# Patient Record
Sex: MALE | Race: WHITE
[De-identification: names, ages, dates, MRNs, and addresses within clinical notes are randomized per-mention and may not be internally consistent; named-entity substitution may affect disease eponyms.]

---

## 2020-04-18 ENCOUNTER — HOSPITAL ENCOUNTER (INPATIENT)
Dept: HOSPITAL 92 - ERS | Age: 35
LOS: 5 days | Discharge: HOME | DRG: 391 | End: 2020-04-23
Attending: FAMILY MEDICINE | Admitting: FAMILY MEDICINE
Payer: COMMERCIAL

## 2020-04-18 VITALS — BODY MASS INDEX: 17.9 KG/M2

## 2020-04-18 DIAGNOSIS — N17.9: ICD-10-CM

## 2020-04-18 DIAGNOSIS — F10.230: ICD-10-CM

## 2020-04-18 DIAGNOSIS — E73.9: ICD-10-CM

## 2020-04-18 DIAGNOSIS — K22.6: ICD-10-CM

## 2020-04-18 DIAGNOSIS — K70.0: ICD-10-CM

## 2020-04-18 DIAGNOSIS — F17.200: ICD-10-CM

## 2020-04-18 DIAGNOSIS — K70.10: ICD-10-CM

## 2020-04-18 DIAGNOSIS — E86.9: ICD-10-CM

## 2020-04-18 DIAGNOSIS — K21.0: Primary | ICD-10-CM

## 2020-04-18 DIAGNOSIS — D62: ICD-10-CM

## 2020-04-18 DIAGNOSIS — Z71.6: ICD-10-CM

## 2020-04-18 DIAGNOSIS — R63.6: ICD-10-CM

## 2020-04-18 DIAGNOSIS — R91.1: ICD-10-CM

## 2020-04-18 DIAGNOSIS — F41.9: ICD-10-CM

## 2020-04-18 DIAGNOSIS — D72.829: ICD-10-CM

## 2020-04-18 LAB
ALBUMIN SERPL BCG-MCNC: 5 G/DL (ref 3.5–5)
ALBUMIN SERPL BCG-MCNC: 5 G/DL (ref 3.5–5)
ALP SERPL-CCNC: 118 U/L (ref 40–110)
ALP SERPL-CCNC: 121 U/L (ref 40–110)
ALT SERPL W P-5'-P-CCNC: 308 U/L (ref 8–55)
ALT SERPL W P-5'-P-CCNC: 309 U/L (ref 8–55)
ANION GAP SERPL CALC-SCNC: 18 MMOL/L (ref 10–20)
APAP SERPL-MCNC: (no result) MCG/ML (ref 10–30)
APTT PPP: 28.1 SEC (ref 22.9–36.1)
AST SERPL-CCNC: 570 U/L (ref 5–34)
AST SERPL-CCNC: 576 U/L (ref 5–34)
BASOPHILS # BLD AUTO: 0 THOU/UL (ref 0–0.2)
BASOPHILS NFR BLD AUTO: 0.1 % (ref 0–1)
BILIRUB DIRECT SERPL-MCNC: 1.1 MG/DL (ref 0.1–0.3)
BILIRUB SERPL-MCNC: 2 MG/DL (ref 0.2–1.2)
BILIRUB SERPL-MCNC: 2.2 MG/DL (ref 0.2–1.2)
BUN SERPL-MCNC: 30 MG/DL (ref 8.9–20.6)
CALCIUM SERPL-MCNC: 9.7 MG/DL (ref 7.8–10.44)
CHLORIDE SERPL-SCNC: 95 MMOL/L (ref 98–107)
CO2 SERPL-SCNC: 27 MMOL/L (ref 22–29)
CREAT CL PREDICTED SERPL C-G-VRATE: 0 ML/MIN (ref 70–130)
DRUG SCREEN CUTOFF: (no result)
EOSINOPHIL # BLD AUTO: 0 THOU/UL (ref 0–0.7)
EOSINOPHIL NFR BLD AUTO: 0 % (ref 0–10)
GLOBULIN SER CALC-MCNC: 2.4 G/DL (ref 2.4–3.5)
GLUCOSE SERPL-MCNC: 173 MG/DL (ref 70–105)
GLUCOSE UR STRIP-MCNC: 30 MG/DL
HBCM INDEX: 0.06 S/CO (ref 0–0.79)
HBSAG INDEX: 0.17 S/CO (ref 0–0.99)
HEP A IGM S/CO: 0.17 S/CO (ref 0–0.79)
HEP C INDEX: 0.04 S/CO (ref 0–0.79)
HGB BLD-MCNC: 13.5 G/DL (ref 14–18)
HIV 1/2 INDEX: 0.07 S/CO (ref ?–1)
INR PPP: 1.2
LIPASE SERPL-CCNC: 56 U/L (ref 8–78)
LYMPHOCYTES # BLD: 0.8 THOU/UL (ref 1.2–3.4)
LYMPHOCYTES NFR BLD AUTO: 5.5 % (ref 21–51)
MAGNESIUM SERPL-MCNC: 1.7 MG/DL (ref 1.6–2.6)
MCH RBC QN AUTO: 34.9 PG (ref 27–31)
MCV RBC AUTO: 103 FL (ref 78–98)
MEDTOX CONTROL LINE VALID?: (no result)
MEDTOX READER #: (no result)
MONOCYTES # BLD AUTO: 0.9 THOU/UL (ref 0.11–0.59)
MONOCYTES NFR BLD AUTO: 6.5 % (ref 0–10)
MUCOUS THREADS UR QL AUTO: (no result) LPF
NEUTROPHILS # BLD AUTO: 11.8 THOU/UL (ref 1.4–6.5)
NEUTROPHILS NFR BLD AUTO: 87.8 % (ref 42–75)
PLATELET # BLD AUTO: 203 THOU/UL (ref 130–400)
POTASSIUM SERPL-SCNC: 4.5 MMOL/L (ref 3.5–5.1)
PROT UR STRIP.AUTO-MCNC: 70 MG/DL
PROTHROMBIN TIME: 15.4 SEC (ref 12–14.7)
RBC # BLD AUTO: 3.88 MILL/UL (ref 4.7–6.1)
RBC UR QL AUTO: (no result) HPF (ref 0–3)
SALICYLATES SERPL-MCNC: (no result) MG/DL (ref 15–30)
SODIUM SERPL-SCNC: 135 MMOL/L (ref 136–145)
SYPHILIS ANTIBODY INDEX: 0.02 S/CO
TSH SERPL DL<=0.005 MIU/L-ACNC: 0.39 UIU/ML (ref 0.35–4.94)
WBC # BLD AUTO: 13.4 THOU/UL (ref 4.8–10.8)
WBC UR QL AUTO: (no result) HPF (ref 0–3)

## 2020-04-18 PROCEDURE — 80053 COMPREHEN METABOLIC PANEL: CPT

## 2020-04-18 PROCEDURE — 85025 COMPLETE CBC W/AUTO DIFF WBC: CPT

## 2020-04-18 PROCEDURE — 80306 DRUG TEST PRSMV INSTRMNT: CPT

## 2020-04-18 PROCEDURE — 36415 COLL VENOUS BLD VENIPUNCTURE: CPT

## 2020-04-18 PROCEDURE — 83735 ASSAY OF MAGNESIUM: CPT

## 2020-04-18 PROCEDURE — P9016 RBC LEUKOCYTES REDUCED: HCPCS

## 2020-04-18 PROCEDURE — 81003 URINALYSIS AUTO W/O SCOPE: CPT

## 2020-04-18 PROCEDURE — 86850 RBC ANTIBODY SCREEN: CPT

## 2020-04-18 PROCEDURE — C9113 INJ PANTOPRAZOLE SODIUM, VIA: HCPCS

## 2020-04-18 PROCEDURE — 86901 BLOOD TYPING SEROLOGIC RH(D): CPT

## 2020-04-18 PROCEDURE — 87389 HIV-1 AG W/HIV-1&-2 AB AG IA: CPT

## 2020-04-18 PROCEDURE — 96361 HYDRATE IV INFUSION ADD-ON: CPT

## 2020-04-18 PROCEDURE — 84443 ASSAY THYROID STIM HORMONE: CPT

## 2020-04-18 PROCEDURE — 80307 DRUG TEST PRSMV CHEM ANLYZR: CPT

## 2020-04-18 PROCEDURE — 84100 ASSAY OF PHOSPHORUS: CPT

## 2020-04-18 PROCEDURE — 80074 ACUTE HEPATITIS PANEL: CPT

## 2020-04-18 PROCEDURE — 74177 CT ABD & PELVIS W/CONTRAST: CPT

## 2020-04-18 PROCEDURE — 36430 TRANSFUSION BLD/BLD COMPNT: CPT

## 2020-04-18 PROCEDURE — 76705 ECHO EXAM OF ABDOMEN: CPT

## 2020-04-18 PROCEDURE — 96365 THER/PROPH/DIAG IV INF INIT: CPT

## 2020-04-18 PROCEDURE — 83690 ASSAY OF LIPASE: CPT

## 2020-04-18 PROCEDURE — 96375 TX/PRO/DX INJ NEW DRUG ADDON: CPT

## 2020-04-18 PROCEDURE — 85730 THROMBOPLASTIN TIME PARTIAL: CPT

## 2020-04-18 PROCEDURE — 86900 BLOOD TYPING SEROLOGIC ABO: CPT

## 2020-04-18 PROCEDURE — 81015 MICROSCOPIC EXAM OF URINE: CPT

## 2020-04-18 PROCEDURE — 86780 TREPONEMA PALLIDUM: CPT

## 2020-04-18 PROCEDURE — 83036 HEMOGLOBIN GLYCOSYLATED A1C: CPT

## 2020-04-18 PROCEDURE — 85610 PROTHROMBIN TIME: CPT

## 2020-04-18 RX ADMIN — ONDANSETRON SCH MG: 2 INJECTION INTRAMUSCULAR; INTRAVENOUS at 21:49

## 2020-04-18 RX ADMIN — ONDANSETRON SCH MG: 2 INJECTION INTRAMUSCULAR; INTRAVENOUS at 15:03

## 2020-04-18 NOTE — PDOC.FPRHP
- History of Present Illness


Chief Complaint: nausea/vomting


History of Present Illness: 


36yo M with h/o EtOH abuse presents for 3day history of nausea and vomiting. 

States onset of sxs Thursday night with fatigue, awoke that night with body 

aches, fever, chills, diaphoresis, and nausea. All day yesterday had nausea 

with vomiting, initially clear liquid vomit, then pink-tinged, and now with 

dark red blood mixed in, nonbilious. Unable to keep solids or liquids down. 

Mild epigastric pain with vomiting. Decreased urine output. 1 normal BM 

yesterday, no diarrhea. No known sick contacts, family members at home without 

similar sxs, no previous similar episodes. Drinks 3+ 8oz glasses of wine 

nightly for many years. Smokes about 0.5ppd for 5-6 years. No history of IV 

drug abuse or STDs. States he has never had alcohol withdrawal seizure but does 

get shakes when he does drink. Called telehealth GI yesterday who sent in 

nausea med with mild improvement of sxs.





ED Course: 


Given Reglan 10mg, Zofran 8mg, 2L NS








- Allergies/Adverse Reactions


 Allergies











Allergy/AdvReac Type Severity Reaction Status Date / Time


 


No Known Allergies Allergy   Unverified 04/18/20 14:05














- Home Medications


 











 Medication  Instructions  Recorded  Confirmed  Type


 


No Known  04/18/20 04/18/20 History














- History


PMHx: Lactose intolerance


 


PSHx: L knee





FHx: Grandfather with MI, CAD. Aunt with lymphoma. 


 


Social: Drinks 3+ 8oz glasses of wine daily for many years. THC use years ago, 

no recent. No IV drug abuse. Smokes <0.5ppd for 5-6 years. Lives at home with 

wife and 2 children. Thinks wife had syphillis in past, he has not been tested. 

Works in oil field.


 








- Review of Systems


General: reports: fever/chills (chills), weight/appetite/sleep changes (

decreased), night sweats, fatigue


Eyes: denies: vision changes


ENT: denies: nasal congestion, rhinorrhea


Respiratory: denies: cough, congestion, shortness of breath


Cardiovascular: denies: chest pain, palpitation, edema


Gastrointestinal: reports: nausea, vomiting, abdominal pain (mild epigastric).  

denies: diarrhea, constipation


Genitourinary: denies: incontinence, dysuria


Skin: denies: rashes


Neurological: denies: numbness





- Vital signs


BP: 129/89  HR: 124 -> 105 RR: 16 Tmax: 98.6 Pox: 99% on RA  Wt: 63kg   








- Physical Exam


Constitutional: NAD, awake, alert and oriented, well developed, other (Thin, 

well-appearing, well-groomed, thin)


HEENT: PERRLA, EOMI, no scleral icterus, grossly normal vision, MMM (after IVF 

in ED), oropharynx clear


Neck: supple


Heart: RRR, normal S1/S2, no murmurs/rubs/gallops, pulses present, no edema


Lungs: CTAB, no respiratory distress, good air movement, no rales/rhonchi, no 

wheezing


Abdomen: soft, bowel sounds present, other (very mild epigastric TTP)


Musculoskeletal: normal structure, normal tone


Neurological: no focal deficit, other (no tremor)


Skin: no rash/lesions


Heme/Lymphatic: no unusual bruising or bleeding


Psychiatric: normal mood and affect





FMR H&P: Results





- Labs


Result Diagrams: 


 04/18/20 08:56





 04/18/20 08:56


Lab results: 


 











WBC  13.4 thou/uL (4.8-10.8)  H  04/18/20  08:56    


 


Hgb  13.5 g/dL (14.0-18.0)  L  04/18/20  08:56    


 


Hct  39.9 % (42.0-52.0)  L  04/18/20  08:56    


 


MCV  103.0 fL (78.0-98.0)  H  04/18/20  08:56    


 


Plt Count  203 thou/uL (130-400)   04/18/20  08:56    


 


Neutrophils %  87.8 % (42.0-75.0)  H  04/18/20  08:56    


 


Sodium  135 mmol/L (136-145)  L  04/18/20  08:56    


 


Potassium  4.5 mmol/L (3.5-5.1)   04/18/20  08:56    


 


Chloride  95 mmol/L ()  L  04/18/20  08:56    


 


Carbon Dioxide  27 mmol/L (22-29)   04/18/20  08:56    


 


BUN  30 mg/dL (8.9-20.6)  H  04/18/20  08:56    


 


Creatinine  1.55 mg/dL (0.7-1.3)  H  04/18/20  08:56    


 


Glucose  173 mg/dL ()  H  04/18/20  08:56    


 


Calcium  9.7 mg/dL (7.8-10.44)   04/18/20  08:56    


 


Total Bilirubin  2.0 mg/dL (0.2-1.2)  H  04/18/20  08:56    


 


AST  570 U/L (5-34)  H  04/18/20  08:56    


 


ALT  308 U/L (8-55)  H  04/18/20  08:56    


 


Alkaline Phosphatase  121 U/L ()  H  04/18/20  08:56    


 


Serum Total Protein  7.4 g/dL (6.0-8.3)   04/18/20  08:56    


 


Albumin  5.0 g/dL (3.5-5.0)   04/18/20  08:56    


 


Lipase  56 U/L (8-78)   04/18/20  08:56    


 


Urine Ketones  20 mg/dL (Negative)  A  04/18/20  Unknown


 


Urine Blood  Negative  (Negative)   04/18/20  Unknown


 


Urine Nitrite  Negative  (Negative)   04/18/20  Unknown


 


Ur Leukocyte Esterase  Negative [place]olia/uL (Negative)   04/18/20  Unknown


 


Urine RBC  0-3 HPF (0-3)   04/18/20  Unknown


 


Urine WBC  4-6 HPF (0-3)  A  04/18/20  Unknown


 


Ur Squamous Epith Cells  0-3 HPF (0-3)   04/18/20  Unknown


 


Urine Bacteria  None Seen HPF (None Seen)   04/18/20  Unknown














- Radiology Interpretation


  ** CT scan - abdomen


Status: report reviewed by me (0.5cm RML pulm nodule, fatty liver, tiny R renal 

cyst)





FMR H&P: A/P





- Problem List


(1) Alcoholic hepatitis


Current Visit: Yes   Status: Acute   Code(s): K70.10 - ALCOHOLIC HEPATITIS 

WITHOUT ASCITES   





(2) Hematemesis


Current Visit: Yes   Status: Acute   Code(s): K92.0 - HEMATEMESIS   





(3) ROSINA (acute kidney injury)


Current Visit: Yes   Status: Acute   Code(s): N17.9 - ACUTE KIDNEY FAILURE, 

UNSPECIFIED   





- Plan


36yo M with h/o EtOH abuse presents for hematemasis and nausea





#Suspected Acute Alcoholic hepatitis


- 3 day onset of nausea, vomiting, mild epigastric pain


- Transaminitis with , , Tbili 2.2


- Hepatitis panel neg


- Will check HIV, RPR


- CT abd - fatty liver


- Ordered RUQ US


- Coags pending


- Alcohol lv pending - drinks 3+ 8oz glasses of wine nightly


- UDS, APAP lv pending





#Hematemesis, suspect Janis Wallace tear


- likely 2/2 above. approx 250cc at bedside


- Place NPO, IV Protonix BID


- Will consult GI, Dr. Klein, apprec recs


- Zofran natalie


- Hb 13.5, likely lower as he is hemoconcentrated, will monitor





#EtOH abuse with withdrawals


- Last drink 4/16, drinks 3+ glasses of wine nightly


- ASE protocol


- Librium taper


- Folic acid, MV, Thiamine





#Suspected prerenal ROSINA


- Cr 1.55, s/p 2L NS in ED


- LR @ 100cc/hr


- monitor





#Leukocytosis


- WBC 13.4, likely 2/2 volume depletion


- no s/s of infection, will monitor





#Pulmonary nodule


- incidental findings on CT Abd


- needs outpt follow up





#Tob abuse


- counseled on cessation





#Lactose Intolerance


- avoid triggering foods





PCP: None - Dr. Sual many years ago


Code: Full





IVF: LR @ 100cc/hr


Diet: NPO


VTE: none - suspected GI bleed





Disposition/LOS: 


Admit to medical inpt for acute alcoholic hepatitis and hematemasis. Workup 

pending. GI consulted. Monitor for EtOH withdrawals. Anticipate LOS > 48hrs.








FMR H&P: Upper Level





- Pertinent history





36 yo M here with complaint of vomiting blood for the past 24 hours. He states 

that on 4/16 he started to have general malaise and loss of appetite. That 

night he had multiple episodes of vomiting, during which time he noticed blood 

streaking and progressive darkening of vomitus. On 4/17-18 he noted vomit was 

all blood/coffee grounds. He has a hx of heavy etoh use daily. His last drink 

was on 4/16. In the past he gets tremors when he goes too long without a drink. 

He denies any hx of IVDU. No hx of hepatitis. CT in the ER demonstrated a 

markedly enlarged liver with fatty infiltrate. 





No known PMHx





Surgical Hx  L tibia ORIF





Social Hx


4+ glasses of wine daily, hard liquor daily


0.5 ppd smoker


Denies drug use





Fam Hx  non contributory 








- Pertinent findings





See intern note for full ROS, PE, vitals, and labs





ROS


General Complains of chills. Denies fever


CV Denies CP, palpitation, or peripheral edema


Resp Denies SOB or cough 


GI complains of vomiting blood. Denies diarrhea or bloody stool. Complains of 

mild abd pain


 denies increased frequency or dysuria 


Neuro denies numbness or weakness





PE


General A&O x4, NAD


HEENT NCAT


CV RRR, no murmur 


Resp CTA, no respiratory distress


Abd RUQ and LUQ with mild TTP. Non palpable liver. No distension or guarding


Extremities no edema, equal pedal pulses


Neuro no focal deficits, CN II-XII intact








- Plan


Date/Time: 04/18/20 6385








I, Eliel Bueno, , have evaluated this patient and agree with findings/plan 

as outlined by intern resident. Pertinent changes/additions are listed here.








1.Acute alcoholic hepatitis 


-Start thiamine, B12, and folate


-Zofran prn for n/v


-RUQ US


-Viral hep panel negative. HIV pending 


-Maintenance IVF


-Check coags and Mg/Phos.  Monitor CMP and coags daily


-ASE protocol


-Consult GI





2.Hematemesis  


-Likely secondary to Janis Wallace tear. 


-Start IV protonix daily. Zofran as above


-Hold NPO


-CBC in am





3.ROSINA


- Likely pre renal. 


-IVF, monitor daily labs 





4.Etoh abuse


-Case management consult 


-Advise cessation 





5.Hyperbilirubinemia 





6.Elevated LFT





7.Leukocytosis


-No concern for infection at this time, likely reactive 





8.Alcoholic fatty liver    





PPx SCD


Diet NPO


Code Full








Addendum - Attending





- Attending Attestation


Date/Time: 04/18/20 6578





I personally evaluated the patient and discussed the management with Dr. Dozier/

Ximena.


I agree with the History, Examination, Assessment and Plan documented above 

with any addition or exceptions noted below.

## 2020-04-18 NOTE — CT
ABDOMEN AND PELVIC CT SCAN WITH IV CONTRAST:

 

History: 

Nausea, vomiting. 

 

FINDINGS: 

0.5 cm diameter nodule in the right middle lobe. Severe fatty changes of the liver. No ductal dilatat
ion. Visualized gallbladder, pancreas, spleen, and adrenal glands are unremarkable. Too small to caroline
acterize low attenuation focus in the posterior right mid kidney statistically a small cyst. No renal
 calculus or acute  obstruction. No CT evidence for acute appendicitis. No evidence for large or sm
all bowel obstruction. No free intraperitoneal fluid within the abdomen or pelvis. Urinary bladder is
 unremarkable. 

 

IMPRESSION: 

1. 0.5 cm diameter pulmonary nodule in the right middle lobe. 

2. Consider one year follow up complete chest CT depending upon risks. 

3. Marked fatty changes in the liver. 

4. Too small to characterize right renal low attenuation focus, statistically a tiny cyst. 

5. No evidence for other significant acute process in the abdomen or pelvis. 

 

Code LN

## 2020-04-19 LAB
ALBUMIN SERPL BCG-MCNC: 3.3 G/DL (ref 3.5–5)
ALP SERPL-CCNC: 62 U/L (ref 40–110)
ALT SERPL W P-5'-P-CCNC: 120 U/L (ref 8–55)
ANION GAP SERPL CALC-SCNC: 12 MMOL/L (ref 10–20)
APTT PPP: 164 SEC (ref 22.9–36.1)
APTT PPP: 27.1 SEC (ref 22.9–36.1)
AST SERPL-CCNC: 172 U/L (ref 5–34)
BASOPHILS # BLD AUTO: 0 THOU/UL (ref 0–0.2)
BASOPHILS # BLD AUTO: 0 THOU/UL (ref 0–0.2)
BASOPHILS NFR BLD AUTO: 0.3 % (ref 0–1)
BASOPHILS NFR BLD AUTO: 0.5 % (ref 0–1)
BILIRUB SERPL-MCNC: 1.3 MG/DL (ref 0.2–1.2)
BUN SERPL-MCNC: 33 MG/DL (ref 8.9–20.6)
CALCIUM SERPL-MCNC: 8 MG/DL (ref 7.8–10.44)
CHLORIDE SERPL-SCNC: 104 MMOL/L (ref 98–107)
CO2 SERPL-SCNC: 26 MMOL/L (ref 22–29)
CREAT CL PREDICTED SERPL C-G-VRATE: 90 ML/MIN (ref 70–130)
EOSINOPHIL # BLD AUTO: 0 THOU/UL (ref 0–0.7)
EOSINOPHIL # BLD AUTO: 0 THOU/UL (ref 0–0.7)
EOSINOPHIL NFR BLD AUTO: 0.3 % (ref 0–10)
EOSINOPHIL NFR BLD AUTO: 0.4 % (ref 0–10)
GLOBULIN SER CALC-MCNC: 1.7 G/DL (ref 2.4–3.5)
GLUCOSE SERPL-MCNC: 93 MG/DL (ref 70–105)
HGB BLD-MCNC: 7.6 G/DL (ref 14–18)
HGB BLD-MCNC: 7.9 G/DL (ref 14–18)
HGB BLD-MCNC: 7.9 G/DL (ref 14–18)
INR PPP: (no result)
INR PPP: 1.2
LYMPHOCYTES # BLD: 1.1 THOU/UL (ref 1.2–3.4)
LYMPHOCYTES # BLD: 1.1 THOU/UL (ref 1.2–3.4)
LYMPHOCYTES NFR BLD AUTO: 12.8 % (ref 21–51)
LYMPHOCYTES NFR BLD AUTO: 13.2 % (ref 21–51)
MAGNESIUM SERPL-MCNC: 1.7 MG/DL (ref 1.6–2.6)
MCH RBC QN AUTO: 35.6 PG (ref 27–31)
MCH RBC QN AUTO: 35.9 PG (ref 27–31)
MCV RBC AUTO: 103 FL (ref 78–98)
MCV RBC AUTO: 103 FL (ref 78–98)
MONOCYTES # BLD AUTO: 0.9 THOU/UL (ref 0.11–0.59)
MONOCYTES # BLD AUTO: 0.9 THOU/UL (ref 0.11–0.59)
MONOCYTES NFR BLD AUTO: 10.2 % (ref 0–10)
MONOCYTES NFR BLD AUTO: 11.3 % (ref 0–10)
NEUTROPHILS # BLD AUTO: 6 THOU/UL (ref 1.4–6.5)
NEUTROPHILS # BLD AUTO: 6.4 THOU/UL (ref 1.4–6.5)
NEUTROPHILS NFR BLD AUTO: 74.8 % (ref 42–75)
NEUTROPHILS NFR BLD AUTO: 76.4 % (ref 42–75)
PLATELET # BLD AUTO: 122 THOU/UL (ref 130–400)
PLATELET # BLD AUTO: 123 THOU/UL (ref 130–400)
PLATELET # BLD AUTO: 129 THOU/UL (ref 130–400)
POTASSIUM SERPL-SCNC: 4.1 MMOL/L (ref 3.5–5.1)
PROTHROMBIN TIME: (no result) SEC (ref 12–14.7)
PROTHROMBIN TIME: 15.5 SEC (ref 12–14.7)
RBC # BLD AUTO: 2.21 MILL/UL (ref 4.7–6.1)
RBC # BLD AUTO: 2.22 MILL/UL (ref 4.7–6.1)
SODIUM SERPL-SCNC: 138 MMOL/L (ref 136–145)
WBC # BLD AUTO: 8 THOU/UL (ref 4.8–10.8)
WBC # BLD AUTO: 8.4 THOU/UL (ref 4.8–10.8)

## 2020-04-19 PROCEDURE — 0W3P8ZZ CONTROL BLEEDING IN GASTROINTESTINAL TRACT, VIA NATURAL OR ARTIFICIAL OPENING ENDOSCOPIC: ICD-10-PCS | Performed by: INTERNAL MEDICINE

## 2020-04-19 RX ADMIN — THERA TABS SCH TAB: TAB at 08:30

## 2020-04-19 RX ADMIN — ONDANSETRON SCH MG: 2 INJECTION INTRAMUSCULAR; INTRAVENOUS at 14:52

## 2020-04-19 RX ADMIN — ONDANSETRON SCH MG: 2 INJECTION INTRAMUSCULAR; INTRAVENOUS at 05:27

## 2020-04-19 RX ADMIN — ONDANSETRON SCH MG: 2 INJECTION INTRAMUSCULAR; INTRAVENOUS at 21:17

## 2020-04-19 NOTE — OP
DATE OF PROCEDURE:  04/19/2020



PROCEDURE PERFORMED:  EGD with control of hemorrhage.



INDICATION FOR PROCEDURE:  Hematemesis, nausea, and vomiting.



DESCRIPTION OF PROCEDURE:  After the risks and benefits of the procedure were

explained to the patient including risks of bleeding, infection, perforation,

reactions to anesthesia, aspiration and/or pain, informed consent was obtained.  The

patient was then taken to the endoscopy suite, where deep sedation was administered

via propofol and anesthesia support.  Once adequate sedation was achieved, the

patient was placed in the left lateral decubitus position, followed by introduction

of the therapeutic gastroscope into the mouth with intubation of the esophagus,

stomach, and the proximal small intestines with the findings listed below.  The

patient tolerated the procedure well with no immediate perioperative complications.

On conclusion of the procedure, all equipment was removed from the patient and he

was transferred to PACU in satisfactory condition. 



FINDINGS:  

1. Esophagus:  Normal-appearing mucosa was seen in the proximal and mid esophagus;

however, in the distal esophagus, a purplish hue, a patch of purple-appearing mucosa

was seen along the inferior aspect of the esophagus extending from 35 cm to 40 cm,

but no evidence of esophageal varices.  At the gastroesophageal junction, there were

3 linear ulcerations as well as multiple erosions, seen consistent with LA grade C

reflux esophagitis.  However, there were two spots at the gastroesophageal junction

that exhibited mild tearing of the mucosa as well as a visible vessel that was

actively bleeding on one and a probable visible vessel on the other that was

nonbleeding.  Both of these visible vessel sites were then intervened upon with

bipolar cauterization with good hemostasis and no active bleeding seen at the end of

the maneuver.  Otherwise, there was no evidence of mass lesions within the distal

esophagus. 

2. Stomach:  A large amount of retained blood and blood clot was seen within all

aspects of the stomach.  With aggressive irrigation and suctioning, adequate

visualization of the gastric mucosa was able to be achieved, but inadequate for the

evaluation of small mucosal lesions (less than 5 mm in size) of the mucosa seen.

Normal-appearing mucosa was seen in the gastric fundus, body, greater curvature,

antrum, and incisura.  On retroflexion, active oozing of blood was seen in the

gastric cardia, but was also seen emanating from the gastroesophageal junction

(prior to bipolar cauterization).  There was no evidence of erosions, ulcerations,

or mass lesions seen throughout the entire stomach. 

3. Duodenum:  Normal-appearing mucosa was seen in both the duodenal bulb and second

portion of the duodenum.  There was no evidence of erosions, ulcerations, mass

lesions, or active/recent bleeding. 



IMPRESSION:  

1. Two Janis-Wallace tears were seen at the gastroesophageal junction, both with

visible vessels seen.  However, one was actively bleeding at the time of this

endoscopy; good hemostasis was achieved with bipolar cauterization with no bleeding

at the end of maneuvers. 

2. LA grade C erosive esophagitis (most likely contributing to the Janis-Wallace

tear). 

3. Significant amount of blood in the stomach, limiting visualization of fine

mucosal lesions within the entire stomach itself, but no evidence of active bleeding

visualized in this region. 



RECOMMENDATIONS:  

1. We will continue to trend his H and H and transfuse as necessary to maintain an H

and H of 7/21. 

2. Continue to monitor clinically for signs of active GI bleeding.

3. We would continue the patient on PPI 40 mg IV b.i.d.

4. We would place the patient on a clear liquid diet and advance the diet slowly as

tolerated. 

5. Would continue with aggressive antiemetic control that seems to be in part

related to his alcohol withdrawal. 

6. Continue with alcohol withdrawal protocol. 



We will continue to follow.  Please call with any questions.







Job ID:  [id number]

## 2020-04-19 NOTE — CON
DATE OF CONSULTATION:  04/19/2020



REASON FOR CONSULTATION:  Hematemesis, melena, and elevated LFTs/alcoholic 
hepatitis.



CONSULTING PROVIDER:  Reg Dozier MD



HISTORY OF PRESENT ILLNESS:  The patient is a 35-year-old male with past medical

history of lactose intolerance and alcohol abuse, presenting with complaints of

nausea, vomiting, and hematemesis.  The patient states that he was in his usual

state of health until approximately 3 days ago when he began having increased

midepigastric abdominal discomfort.  The next morning, this was associated with

increased nausea and vomiting, initially with clear nonbilious emesis.  However
, as

he had repeated episodes of nausea and vomiting throughout the day, it became 
more

blood-tinged until finally it was more dark maroon in coloration.  This was

associated with increased weakness, midepigastric abdominal pain, subjective

chills/diaphoresis.  It initially prompted him to [facility]

where he spoke with Dr. Cassidy about his particular symptoms with the plan of 
care to

proceed with antiemetic control, but if his symptoms persisted, then he 
recommended

proceeding to the nearest ER for further evaluation.  With repeated episodes of

nausea, vomiting, as well as hematemesis, the patient then went to the [facility]

ER and was noted to have a decreased H and H, but only mildly decreased when

compared to baseline.  He was admitted to the hospital for observation 
overnight and

had no further episodes of nausea and vomiting with aggressive antiemetic 
control.

However, over the last 12 to 24 hours, the patient has been having increased

diarrhea consisting of dark black, semi-solid to liquid bowel movements, and 
also

had a concurrent drop in his H and H concerning for upper GI bleeding.  
Otherwise,

he denies any fevers, dysphagia, odynophagia, overt hematochezia, or recent 
weight

loss. 



REVIEW OF SYSTEMS:  A 10-category review of systems was obtained with all 
responses

negative except for the pertinent positives as listed in HPI. 



PAST MEDICAL HISTORY:  As per HPI.



PAST SURGICAL HISTORY:  Left knee arthroscopy.



FAMILY HISTORY:  Denies any GI malignancies.



SOCIAL HISTORY:  Drinks 3 or more glasses of wine nightly for at least the last 
3-5

years, smokes approximately 1/2 pack per day for the last 5 to 6 years.  He does

endorse remote marijuana use, but his last use was approximately 6 years ago. 



OUTPATIENT MEDICATIONS:  None.



ALLERGIES:  NO KNOWN DRUG ALLERGIES.



PHYSICAL EXAMINATION:

VITAL SIGNS:  Temperature 98.4, pulse 101, blood pressure 112/70, respiratory 
rate

18, saturating 93% on room air. 

GENERAL:  The patient was lying in bed, in no acute distress.  Alert and 
oriented

x4. 

HEENT:  Normocephalic and atraumatic. 

NECK:  Supple.  No JVD or scleral icterus noted. 

CARDIOVASCULAR:  Tachycardic rate but regular rhythm.  No discernible murmurs,

gallops, or rubs. 

RESPIRATORY:  Clear to auscultation bilaterally with no discernible wheezes or

rales. 

ABDOMEN:  Hypoactive bowel sounds.  Soft, nondistended.  Tenderness to 
palpation in

the midepigastric region only. 

EXTREMITIES:  No cyanosis, clubbing, or edema.



LABORATORY DATA:  CBC with a white blood cell count of 8.4, hemoglobin 7.9,

hematocrit 22.8, platelets 129.  INR 1.2.  Chemistry with a sodium of 138, 
potassium

4.1, chloride 104, CO2 of 26, BUN 33, creatinine 0.98, glucose 88.  , ALT

120, alkaline phosphatase 62, total bilirubin 1.3, albumin 3.3, lipase 56. 



IMAGING DATA:  CT of the abdomen and pelvis was obtained on April 18, 2020, 
which

showed a 5 mm pulmonary nodule within the right middle lobe with indeterminate

significance and too small to characterize, severe fatty changes were also seen

within the liver, but did not show any hepatic masses or ductal dilatation

associated with that finding.  There was no evidence of acute appendicitis,

small-bowel obstruction, or free intraperitoneal fluid.  Right upper quadrant

ultrasound was also obtained on April 19, 2020, with official read still 
pending at

this time. 



ASSESSMENT AND PLAN:  The patient is a 35-year-old male with past medical 
history of

alcohol abuse and lactose intolerance, presenting with hematemesis. 



Hematemesis:  The patient is presenting with a 3-day history of increased 
nausea and

vomiting that was initially characterized as clear nonbilious emesis.  However, 
with

repeated episodes of vomiting/retching, he has gradually had more blood-tinged 
and

then finally more maroon-colored hematemesis in addition to what appears to be

melenic-type stools.  He denies the use of NSAIDs that would increase his risk 
for

peptic ulcer disease, but it is also on the differential and CT scan is not 
showing

any significant abnormalities that would contribute to the above symptoms.  
However,

he has had a significant decrease in his H and H when compared to admission

concerning for active GI bleeding.  Differential could include esophagitis,

gastritis, duodenitis, peptic ulcer disease, arteriovenous malformation, 
Dieulafoy

lesion, and/or GI neoplasm (less likely). 



RECOMMENDATIONS:  

1. Would continue to trend his H and H and transfuse as necessary to maintain 
an H

and H of 7/21. 

2. Continue to monitor clinically for signs of active GI bleeding.

3. Continue the patient on PPI b.i.d. in addition to aggressive antiemetic 
control.

4. Continue n.p.o. status in preparation for EGD later this morning.  Further

recommendations to follow upper endoscopy. 



Elevated LFTs/alcoholic hepatitis

The patient is presenting with a significant alcohol abuse history, drinking 
approximately 3 or more glasses of wine daily for several years.  On admission, 
the patient was noted to have a moderate elevation in his AST, ALT, and Tbili 
with an ALT/AST predominance, consistent with a hepatocellular process.  However
, with more supportive care with IV fluid hydration, he has had a significant 
reduction in all of his LFTs (with normalization of Tbili and INR).  At this 
time, given the 2:1 predominance of AST to ALT on admission, this seems to be 
most consistent with mild alcoholic hepatitis.



RECOMMENDATIONS:

1. Would continue to trend his LFTs and INR daily to monitor liver function.

2. Continue with supportive care with IV fluid and thiamine administration as 
you are doing.

3. Strongly encourage alcohol cessation.

4. Steroid administration is not indicated at this time.

5. Agree with placing the patient on an alcoholic withdrawal protocol given his 
history of tremors with cessation in the past.



We will continue to follow.  Please call with any questions.







Job ID:  [id number]



[facility]

## 2020-04-19 NOTE — PDOC.FM
- Subjective


Subjective: 


Doing well this morning, no acute events overnight. No agitation, hallucinations

, restlessness. No further vomiting, nausea controlled, but is now having 4+ 

episodes of dark, tarry diarrhea. No fever/chills, SOB, CP, or abd pain. Eager 

to talk to GI today. He also states he turned to alcohol to treat his anxiety. 

He tried zoloft in the past but had SE's so stopped. He wants to quit alcohol 

and try a different medication for anxiety. Discussed possible AA meetings and 

other therapies as well. Very motivated to establish with PCP and begin 

treatments.








- Objective


MAR Reviewed: Yes


Vital Signs & Weight: 


 Vital Signs (12 hours)











  Temp Pulse Resp BP BP Pulse Ox


 


 04/19/20 04:15  98.5 F  109 H  18   106/66  95


 


 04/19/20 04:00     106/66  


 


 04/18/20 23:46  98.7 F  112 H  18   114/75  96


 


 04/18/20 20:11  98.1 F  107 H  18   123/87  96


 


 04/18/20 20:10     123/87  








 Weight











Weight                         60.192 kg














I&O: 


 











 04/18/20 04/19/20 04/20/20





 06:59 06:59 06:59


 


Intake Total  1000 


 


Balance  1000 











Result Diagrams: 


 04/19/20 08:08





 04/19/20 06:58





Phys Exam





- Physical Examination


Constitutional: NAD (resting comfortably, no tremor, good spirits)


HEENT: moist MMs


Neck: supple


Respiratory: no wheezing, no rales, no rhonchi, clear to auscultation bilateral


Cardiovascular: RRR, no significant murmur, no rub


Gastrointestinal: soft, non-tender, no distention, positive bowel sounds


Musculoskeletal: no edema


Neurological: moves all 4 limbs


Psychiatric: normal affect, A&O x 3


Deviation from normal: anxiety





Dx/Plan


(1) Alcoholic hepatitis


Code(s): K70.10 - ALCOHOLIC HEPATITIS WITHOUT ASCITES   Status: Acute   





(2) Hematemesis


Code(s): K92.0 - HEMATEMESIS   Status: Acute   





(3) ROSINA (acute kidney injury)


Code(s): N17.9 - ACUTE KIDNEY FAILURE, UNSPECIFIED   Status: Acute   





- Plan


Plan: 


36yo M with h/o EtOH abuse presents for hematemasis and nausea





#Suspected Acute Alcoholic hepatitis


- 3 day onset of nausea, vomiting, mild epigastric pain - improved this AM


- Transaminitis with , , Tbili 2.2


- Hepatitis panel neg, HIV and RPR neg


- CT abd - enlarged, fatty liver


- RUQ US pending


- INR 1.2


- UDS, APA, and EtOH lv's negative


- Pt overall improved this AM, eager to quit alcohol for good





#Hematemesis, suspect Janis Wallace tear


- likely 2/2 above. approx 250cc hematemesis at bedside in ED


- Place NPO, IV Protonix BID


- no further hematemesis since admission, nausea resolved


- now with dark tarry diarrhea, likely 2/2 upper GI bleed


- GI consulted, Dr. Kelin, apprec recs


- Zofran natalie


- Hb 13.5, likely lower as he is hemoconcentrated, AM labs pending, will monitor





#EtOH abuse with withdrawals


- Last drink 4/16, drinks 4+ glasses of wine nightly


- ASE protocol, scores 6-5-6-4 overnight


- Librium taper


- Folic acid, MV, Thiamine


- wants to quit drinking, discussed AA meetings and librium taper





#Anxiety


- Self-treated with alcohol in past


- interested in starting SSRI


- Had SE of sexual dysfunction with zoloft


- will start after GI workup





#Suspected prerenal ROSINA


- Cr 1.55, s/p 2L NS in ED


- LR @ 100cc/hr


- monitor, AM labs pending


- replace lytes as necessary





#Leukocytosis


- WBC 13.4, likely 2/2 volume depletion


- no s/s of infection, will monitor





#Pulmonary nodule


- incidental findings on CT Abd


- needs outpt follow up





#Tob abuse


- counseled on cessation





#Lactose Intolerance


- avoid triggering foods





PCP: None - Dr. Saul many years ago


Code: Full





IVF: LR @ 100cc/hr


Diet: NPO


VTE: none - suspected GI bleed





Disposition/LOS: 


Admitted to medical inpt for acute alcoholic hepatitis and hematemasis. GI 

consulted. Monitor for EtOH withdrawals. Treating underlying conditions. 

Anticipate LOS > 48hrs.








Addendum - Attending





- Attending Attestation


Date/Time: 04/19/20 1005





I personally evaluated the patient and discussed the management with Dr. Dozier. 


I agree with the History, Examination, Assessment and Plan documented above 

with any addition or exceptions noted below.





Patient stable. Going for EGD today due to suspected UGIB. H/H has dropped 

significantly. LFTs improved. GI on board. Transfuse if H/H continues to 

decline.

## 2020-04-20 LAB
ALBUMIN SERPL BCG-MCNC: 3 G/DL (ref 3.5–5)
ALP SERPL-CCNC: 66 U/L (ref 40–110)
ALT SERPL W P-5'-P-CCNC: 92 U/L (ref 8–55)
ANION GAP SERPL CALC-SCNC: 9 MMOL/L (ref 10–20)
AST SERPL-CCNC: 145 U/L (ref 5–34)
BASOPHILS # BLD AUTO: 0.1 THOU/UL (ref 0–0.2)
BASOPHILS NFR BLD AUTO: 1.4 % (ref 0–1)
BILIRUB SERPL-MCNC: 1 MG/DL (ref 0.2–1.2)
BUN SERPL-MCNC: 15 MG/DL (ref 8.9–20.6)
CALCIUM SERPL-MCNC: 7.9 MG/DL (ref 7.8–10.44)
CHLORIDE SERPL-SCNC: 102 MMOL/L (ref 98–107)
CO2 SERPL-SCNC: 28 MMOL/L (ref 22–29)
CREAT CL PREDICTED SERPL C-G-VRATE: 105 ML/MIN (ref 70–130)
EOSINOPHIL # BLD AUTO: 0.2 THOU/UL (ref 0–0.7)
EOSINOPHIL NFR BLD AUTO: 3.1 % (ref 0–10)
GLOBULIN SER CALC-MCNC: 1.4 G/DL (ref 2.4–3.5)
GLUCOSE SERPL-MCNC: 93 MG/DL (ref 70–105)
HGB BLD-MCNC: 5.9 G/DL (ref 14–18)
HGB BLD-MCNC: 8 G/DL (ref 14–18)
LYMPHOCYTES # BLD: 1.8 THOU/UL (ref 1.2–3.4)
LYMPHOCYTES NFR BLD AUTO: 33.4 % (ref 21–51)
MAGNESIUM SERPL-MCNC: 1.6 MG/DL (ref 1.6–2.6)
MCH RBC QN AUTO: 36.8 PG (ref 27–31)
MCV RBC AUTO: 103 FL (ref 78–98)
MONOCYTES # BLD AUTO: 0.6 THOU/UL (ref 0.11–0.59)
MONOCYTES NFR BLD AUTO: 10.5 % (ref 0–10)
NEUTROPHILS # BLD AUTO: 2.7 THOU/UL (ref 1.4–6.5)
NEUTROPHILS NFR BLD AUTO: 51.6 % (ref 42–75)
PLATELET # BLD AUTO: 92 THOU/UL (ref 130–400)
POTASSIUM SERPL-SCNC: 3.2 MMOL/L (ref 3.5–5.1)
RBC # BLD AUTO: 1.6 MILL/UL (ref 4.7–6.1)
SODIUM SERPL-SCNC: 136 MMOL/L (ref 136–145)
WBC # BLD AUTO: 5.2 THOU/UL (ref 4.8–10.8)

## 2020-04-20 PROCEDURE — 30233N1 TRANSFUSION OF NONAUTOLOGOUS RED BLOOD CELLS INTO PERIPHERAL VEIN, PERCUTANEOUS APPROACH: ICD-10-PCS | Performed by: FAMILY MEDICINE

## 2020-04-20 RX ADMIN — ONDANSETRON SCH MG: 2 INJECTION INTRAMUSCULAR; INTRAVENOUS at 05:35

## 2020-04-20 RX ADMIN — ONDANSETRON SCH MG: 2 INJECTION INTRAMUSCULAR; INTRAVENOUS at 12:59

## 2020-04-20 RX ADMIN — THERA TABS SCH TAB: TAB at 07:49

## 2020-04-20 NOTE — PRG
DATE OF SERVICE:  04/20/2020



REASON FOR CONSULTATION:  Hematemesis, melena, actively bleeding Janis-Wallace tear.



SUBJECTIVE:  Yesterday afternoon and yesterday evening, the patient continued to

have black-colored bowel movements with approximately 4 to 5 episodes of these black

bowel movements.  However, today he has had no further bowel movements at all nor

has he had any repeat episodes of hematemesis.  Currently, he states that he is

feeling much better without any complaints of nausea, vomiting, fevers, chills,

hematemesis, melena, hematochezia, dysphagia, or odynophagia. 



OBJECTIVE:  VITAL SIGNS:  Temperature 98.1, pulse 89, blood pressure 106/71,

respiratory rate 16, and saturating 97% on room air. 

GENERAL:  The patient was sitting at bedside, in no acute distress.  Alert and

oriented x4. 

CARDIOVASCULAR:  Regular rate and rhythm. 

RESPIRATORY:  Clear to auscultation bilaterally. 

ABDOMEN:  Normoactive bowel sounds.  Soft, nontender, and nondistended. 

EXTREMITIES:  No cyanosis, clubbing, or edema.



LABORATORY DATA:  CBC with a white blood cell count of 5.2, hemoglobin 5.9,

hematocrit 16.5, and platelets 92.  Chemistry with a sodium of 136, potassium 3.2,

chloride 102, CO2 of 28, BUN 15, creatinine 0.84, and glucose 93.  , ALT 92,

alkaline phosphatase 66, and total bilirubin 1.0. 



IMAGING DATA:  The patient underwent upper endoscopy on 04/19/2020, which showed the

presence of two visible vessels within the distal esophagus, one of which was

actively bleeding and consistent with Janis-Wallace tear, eroding into a vessel.

Both of these vessels were intervened upon with bipolar cauterization with good

hemostasis achieved.  LA grade C reflux esophagitis was also seen in the distal

esophagus, but did not appear to be actively bleeding.  A large amount of retained

blood was seen within the stomach, but there were no abnormality seen in that region

as well as no abnormality seen within duodenum. 



ASSESSMENT AND PLAN:  The patient is a 35-year-old male with past medical history of

alcohol abuse, presenting with hematemesis secondary to bleeding Janis-Wallace tear

and alcoholic hepatitis. 

1. Hematemesis/bleeding Janis-Wallace tear:  The patient initially presented with a

2- to 3-day history of increased nausea and vomiting with progressively worsening

hematemesis during that time.  On admission, he was noted to have a decreased

hemoglobin and hematocrit, but not significantly decreased when compared to

baseline.  However, in the next 24 to 48 hours, he did exhibit a significant decline

in his hemoglobin and hematocrit, indicative of gastrointestinal bleeding.  He

subsequently underwent upper endoscopy on 04/19/2020, which showed the presence of

what appeared to be two visible vessels within the distal esophagus, one of which

was actively bleeding.  Both of these were intervened upon with bipolar

cauterization with good hemostasis achieved.  In the postoperative period, the

patient has been having black bowel movements yesterday, but this has since stopped

today, making the likelihood of no further bleeding higher.  He did have a continued

decrease in his hemoglobin and hematocrit when compared to yesterday, but I feel

this may be due to the actively bleeding that had been going on prior to the

procedure and that the body is now equilibrated to the new total body volume.

Recommendations: 

a. Would continue to trend his hemoglobin and hematocrit and transfuse as necessary

to maintain the hemoglobin and hematocrit of 7/21. 

    b. Continue to monitor clinically for signs of active GI bleeding.

c. I agree with primary team in transfusing the patient 2 units of PRBCs given his

decreased hemoglobin and hematocrit. 

d. Continue the patient on PPI b.i.d. in addition to aggressive antiemetic control. 

2. Elevated liver function tests/alcoholic hepatitis.  The patient is presenting

with a significant alcohol abuse history, drinking approximately 3 or more glasses

of wine daily for the last several years.  On admission, his labs were consistent

with alcoholic hepatitis that is currently responding to more conservative

management.  Recommendations: 

    a. Continue to trend his LFTs and INR daily to monitor liver function.

    b. Continue with supportive care as you are doing.

    c. Strongly encouraged alcohol cessation.

    d. Continue with the alcoholic withdrawal protocol. 

We will continue to follow.  Please call with any questions.







Job ID:  [id number]

## 2020-04-20 NOTE — PDOC.FM
- Subjective


Subjective: 





Pt states he is ready to stop drinking etoh indefinitely. 


No further hematemesis 


c/o 5 black tarry stools since yesterday. 


Hgb dropped to 5.9 this AM. 





Denies SOB or palpitations. States he feels slightly weaker. 





- Objective


MAR Reviewed: Yes


Vital Signs & Weight: 


 Vital Signs (12 hours)











  Temp Pulse Resp BP BP Pulse Ox


 


 04/20/20 04:47  98.1 F  84  20   97/56 L  97


 


 04/20/20 00:00  98.2 F  85  20   99/61  97


 


 04/19/20 20:20     102/68  


 


 04/19/20 20:00  98.8 F  96  20   102/68  97








 Weight











Weight                         60.192 kg














I&O: 


 











 04/18/20 04/19/20 04/20/20





 06:59 06:59 06:59


 


Intake Total  1000 2300


 


Balance  1000 2300











Result Diagrams: 


 04/20/20 06:20





 04/20/20 06:20





Phys Exam





- Physical Examination


Constitutional: NAD


HEENT: moist MMs


Neck: no nodes, no JVD


Respiratory: no wheezing, no rales, clear to auscultation bilateral


Cardiovascular: RRR, no significant murmur


Gastrointestinal: soft, no distention


Musculoskeletal: no edema, pulses present


Neurological: non-focal, moves all 4 limbs


Lymphatic: no nodes


Psychiatric: normal affect, A&O x 3


Skin: no rash





Dx/Plan


(1) Gastrointestinal hemorrhage with melena


Code(s): K92.1 - MELENA   Status: Acute   





(2) Acute blood loss anemia


Code(s): D62 - ACUTE POSTHEMORRHAGIC ANEMIA   Status: Acute   





(3) ROSINA (acute kidney injury)


Code(s): N17.9 - ACUTE KIDNEY FAILURE, UNSPECIFIED   Status: Acute   





(4) Alcoholic hepatitis


Code(s): K70.10 - ALCOHOLIC HEPATITIS WITHOUT ASCITES   Status: Acute   





(5) Hematemesis


Code(s): K92.0 - HEMATEMESIS   Status: Acute   





- Plan


Plan: 





36yo M with h/o EtOH abuse presents for hematemasis and nausea





#Suspected Acute Alcoholic hepatitis


- 3 day onset of nausea, vomiting, mild epigastric pain - improved this AM


- Transaminitis with -> 172, -> 120, Tbili 2.2-> 1.3


- Hepatitis panel neg, HIV and RPR neg


- CT abd - enlarged, fatty liver


- RUQ US, hepatomegaly 


- INR 1.2


- UDS, APA, and EtOH lv's negative


- Pt overall improved this AM, eager to quit alcohol for good





#Upper GI bleed, 2/2 X2 Janis Dee tears and erosive esophagitis 


- likely 2/2 above. approx 250cc hematemesis at bedside in ED


- no further hematemesis since admission, nausea resolved


- GI consulted, Dr. Klein, apprec recs


   - EGD on 4/19 showed X2 janis dee tears and erosive esophagitis KELTON burt 

C


   - Continue PPI IV 40 mg BID 


   - Clear liquid diet, advance as tolerated 


   - antiemetics


   - transfuse below hgb 7 hct 21


- Zofran natalie


- Hb 13.5 on admission, This AM 5.9 hgb


- Ordered 1 unit pRBC and recheck H&H 4 hours post transfusion. 





#Acute blood loss anemia 


- Hb 13.5 on admission, This AM 5.9 hgb


- Ordered 1 unit pRBC and recheck H&H 4 hours post transfusion. 





#EtOH abuse with withdrawals


- Last drink 4/16, drinks 4+ glasses of wine nightly


- ASE protocol, scores 6-4-3 overnight


- Librium taper


- Folic acid, MV, Thiamine


- wants to quit drinking, discussed AA meetings and librium taper





#Anxiety


- Self-treated with alcohol in past


- interested in starting SSRI


- Had SE of sexual dysfunction with zoloft


- will start lexapro after GI workup or at f/u visit with PCP 





#Suspected prerenal ROSINA


- Cr 1.55, s/p 2L NS in ED


- LR @ 100cc/hr


- monitor, AM labs pending


- replace lytes as necessary





#Leukocytosis, resolved 


- WBC 13.4, likely 2/2 volume depletion--> 5.2 


- no s/s of infection, will monitor





#Pulmonary nodule


- incidental findings on CT Abd


- needs outpt follow up





#Tob abuse


- counseled on cessation





#Lactose Intolerance


- avoid triggering foods





PCP: None - Dr. Saul many years ago


Code: Full





IVF: LR @ 100cc/hr


Diet: clears, advance as tolerated. 


VTE: none - upper GI bleed 





Disposition/LOS: 


Admitted to medical inpt for acute alcoholic hepatitis and hematemasis. GI 

consulted. Monitor for EtOH withdrawals. Treating underlying conditions. 

Anticipate LOS > 48hrs.








Addendum - Attending





- Attending Attestation


Date/Time: 04/20/20 1991





I personally evaluated the patient and discussed the management with Dr. Resendez


I agree with the History, Examination, Assessment and Plan documented above 

with any addition or exceptions noted below.





H&H dropped overnight. Tx 2 u PRBCs. repeat H&H this afternoon. Will await 

further GI recs. Continue librium taper.

## 2020-04-21 LAB
ALBUMIN SERPL BCG-MCNC: 3 G/DL (ref 3.5–5)
ALP SERPL-CCNC: 96 U/L (ref 40–110)
ALT SERPL W P-5'-P-CCNC: 109 U/L (ref 8–55)
ANION GAP SERPL CALC-SCNC: 9 MMOL/L (ref 10–20)
AST SERPL-CCNC: 205 U/L (ref 5–34)
BASOPHILS # BLD AUTO: 0 THOU/UL (ref 0–0.2)
BASOPHILS NFR BLD AUTO: 0.9 % (ref 0–1)
BILIRUB SERPL-MCNC: 1 MG/DL (ref 0.2–1.2)
BUN SERPL-MCNC: 7 MG/DL (ref 8.9–20.6)
CALCIUM SERPL-MCNC: 8.1 MG/DL (ref 7.8–10.44)
CHLORIDE SERPL-SCNC: 106 MMOL/L (ref 98–107)
CO2 SERPL-SCNC: 28 MMOL/L (ref 22–29)
CREAT CL PREDICTED SERPL C-G-VRATE: 109 ML/MIN (ref 70–130)
EOSINOPHIL # BLD AUTO: 0.2 THOU/UL (ref 0–0.7)
EOSINOPHIL NFR BLD AUTO: 5 % (ref 0–10)
GLOBULIN SER CALC-MCNC: 1.3 G/DL (ref 2.4–3.5)
GLUCOSE SERPL-MCNC: 95 MG/DL (ref 70–105)
HGB BLD-MCNC: 7.3 G/DL (ref 14–18)
HGB BLD-MCNC: 7.3 G/DL (ref 14–18)
INR PPP: 0.9
LYMPHOCYTES # BLD: 1.7 THOU/UL (ref 1.2–3.4)
LYMPHOCYTES NFR BLD AUTO: 37.3 % (ref 21–51)
MCH RBC QN AUTO: 34.3 PG (ref 27–31)
MCV RBC AUTO: 97.7 FL (ref 78–98)
MONOCYTES # BLD AUTO: 0.5 THOU/UL (ref 0.11–0.59)
MONOCYTES NFR BLD AUTO: 10.3 % (ref 0–10)
NEUTROPHILS # BLD AUTO: 2.1 THOU/UL (ref 1.4–6.5)
NEUTROPHILS NFR BLD AUTO: 46.5 % (ref 42–75)
PLATELET # BLD AUTO: 121 THOU/UL (ref 130–400)
POTASSIUM SERPL-SCNC: 3.8 MMOL/L (ref 3.5–5.1)
PROTHROMBIN TIME: 12.5 SEC (ref 12–14.7)
RBC # BLD AUTO: 2.13 MILL/UL (ref 4.7–6.1)
SODIUM SERPL-SCNC: 139 MMOL/L (ref 136–145)
WBC # BLD AUTO: 4.5 THOU/UL (ref 4.8–10.8)

## 2020-04-21 RX ADMIN — THERA TABS SCH TAB: TAB at 08:03

## 2020-04-21 NOTE — PDOC.FM
- Subjective


Subjective: 





Pt has not had continued dark BM's. 


States he is eager to go home to help take care of his kids. 





Hgb dropped to 7.3 from yesterday. 


plt 121. 





denies N/V.








- Objective


MAR Reviewed: Yes


Vital Signs & Weight: 


 Vital Signs (12 hours)











  Temp Pulse Resp BP BP Pulse Ox


 


 04/21/20 04:00  97.7 F  89  18   106/63  99


 


 04/21/20 00:00  97.9 F  94  18  100/62  100/62  98


 


 04/20/20 20:00       98


 


 04/20/20 19:17  98.2 F  86  18   100/62  98








 Weight











Admit Weight                   60.192 kg


 


Weight                         60.192 kg














I&O: 


 











 04/19/20 04/20/20 04/21/20





 06:59 06:59 06:59


 


Intake Total 1000 2300 2960


 


Balance 1000 2300 2960











Result Diagrams: 


 04/21/20 05:48





 04/21/20 05:48





Phys Exam





- Physical Examination


Constitutional: NAD


HEENT: moist MMs, sclera anicteric


Neck: no nodes, no JVD, full ROM


Respiratory: no wheezing, no rales, no rhonchi, clear to auscultation bilateral


Cardiovascular: RRR, no significant murmur


Gastrointestinal: soft, non-tender, no distention, positive bowel sounds


Musculoskeletal: no edema, pulses present


Neurological: non-focal, normal sensation, moves all 4 limbs


Psychiatric: normal affect, A&O x 3


Skin: no rash, normal turgor, cap refill <2 seconds





Dx/Plan


(1) Gastrointestinal hemorrhage with melena


Code(s): K92.1 - MELENA   Status: Acute   





(2) Acute blood loss anemia


Code(s): D62 - ACUTE POSTHEMORRHAGIC ANEMIA   Status: Acute   





(3) ROSINA (acute kidney injury)


Code(s): N17.9 - ACUTE KIDNEY FAILURE, UNSPECIFIED   Status: Acute   





(4) Alcoholic hepatitis


Code(s): K70.10 - ALCOHOLIC HEPATITIS WITHOUT ASCITES   Status: Acute   





(5) Hematemesis


Code(s): K92.0 - HEMATEMESIS   Status: Acute   





- Plan


Plan: 





34yo M with h/o EtOH abuse presents for hematemasis and nausea





#Suspected Acute Alcoholic hepatitis


- 3 day onset of nausea, vomiting, mild epigastric pain - improved this AM


- Transaminitis with -> 172-> 205, -> 120-> 109, Tbili 2.2-> 1.3


- Hepatitis panel neg, HIV and RPR neg


- CT abd - enlarged, fatty liver


- RUQ US, hepatomegaly 


- INR 1.2


- UDS, APA, and EtOH lv's negative


- Pt overall improved this AM, eager to quit alcohol for good





#Upper GI bleed, 2/2 X2 Stefani Dee tears and erosive esophagitis 


- likely 2/2 above. approx 250cc hematemesis at bedside in ED


- no further hematemesis since admission, nausea resolved


- GI consulted, Dr. Klein, apprec recs


   - EGD on 4/19 showed X2 stefani dee tears and erosive esophagitis KELTON TUBBS


   - Continue PPI IV 40 mg BID 


   - Clear liquid diet, advance as tolerated 


   - antiemetics


   - transfuse below hgb 7 hct 21


- Zofran PRN


- Hb 13.5 on admission, 5.9 hgb on 4/20, 7.3 on 4/21. 


- Ordered 1 unit pRBC and recheck H&H 4 hours post transfusion was 8. 





#Acute blood loss anemia 


- Hb 13.5 on admission, 5.9 hgb on 4/20, 7.3 on 4/21. 


- given 2 unit pRBC on 4/20 and recheck H&H 4 hours post transfusion was 8.0. 





#EtOH abuse with withdrawals


- Last drink 4/16, drinks 4+ glasses of wine nightly


- ASE protocol, scores 4, 3, 2,1 overnight


- Librium taper


- Folic acid, MV, Thiamine


- wants to quit drinking, discussed AA meetings and librium taper





#Anxiety


- Self-treated with alcohol in past


- interested in starting SSRI


- Had SE of sexual dysfunction with zoloft


- will start lexapro after GI workup or at f/u visit with PCP 





#Suspected prerenal ROSINA


- Cr 1.55, s/p 2L NS in ED


- LR @ 100cc/hr


- monitor, AM labs pending


- replace lytes as necessary





#Leukocytosis, resolved 


- WBC 13.4, likely 2/2 volume depletion--> 5.2 -> 4.5


- no s/s of infection, will monitor





#Pulmonary nodule


- incidental findings on CT Abd


- needs outpt follow up





#Tob abuse


- counseled on cessation





#Lactose Intolerance


- avoid triggering foods





PCP: None - Dr. Saul many years ago


Code: Full





IVF: LR @ 100cc/hr


Diet: clears, advance as tolerated. 


VTE: none - upper GI bleed 





Disposition/LOS: 


Admitted to medical inpt for acute alcoholic hepatitis and hematemasis. GI 

consulted. Monitor for EtOH withdrawals. Treating underlying conditions. 

Anticipate LOS > 48hrs.





Addendum - Attending





- Attending Attestation


Date/Time: 04/21/20 8500





I personally evaluated the patient and discussed the management with Dr. Resendez


I agree with the History, Examination, Assessment and Plan documented above 

with any addition or exceptions noted below.





Seen while walking today. will repeat H&H this afternoon and may need 

additional unit of blood. advancing diet today. Possible d/c in the next 24-48 

hr.

## 2020-04-21 NOTE — PRG
DATE OF SERVICE:  



This is a GI followup.



HISTORY:  Mr. Wilhelm has had no further vomiting.  He wants to eat.  He has been

tolerating clear liquids, expected to be transferred to full liquids here soon, and

his stool is cleared now.  He last drank about 2 to 3 weeks ago.  He notes that he

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

and he began to get more pressure at work and then started drinking __________. 



PHYSICAL EXAMINATION:

VITAL SIGNS:  Temperature is 98.1, pulse is 80, blood pressure 103/67. 

GENERAL:  He is resting comfortably in bed.  He is neither diaphoretic nor anxious

or tremulous.  He appears well. 

LUNGS:  Clear. 

HEART:  Regular rate and rhythm without clicks or murmurs. 

ABDOMEN:  Soft and nontender.



LABORATORY DATA:  White count 4.5, hemoglobin 7.3, platelet count 121.  INR is 12.5.

 Sodium is 139, potassium 3.8, BUN and creatinine are 7 and 0.8.  AST is up to 205,

ALT is 109, alkaline phosphatase is 96.  Albumin is 3.  Lipase was normal on

admission.  TSH is normal at 0.3.  Hepatitis A, B, and C are negative.  LFTs were

markedly improved from admission. 



RECOMMENDATIONS:  Advance diet as tolerated.  Monitor LFTs.  With the viral

hepatitis serologies, this is likely an acute episode.  These need to be followed as

an outpatient if he goes home.  He can follow up back with Dr. Klein in a few weeks

to make sure that it came down.  It would be benefit for him to take multivitamin,

thiamine, and folate, which he is on here.  I have talked about alcohol cessation.

He wants to follow up with primary doctor in the outpatient setting.  Discussed

treatment for anxiety.  We will leave this to the internal medicine service.  When

he goes home, he needs to go home on 8 weeks of PPI therapy once a day, 40 mg of

Protonix or equivalent. 





Job ID:  [id number]

## 2020-04-22 LAB
ALBUMIN SERPL BCG-MCNC: 3.1 G/DL (ref 3.5–5)
ALP SERPL-CCNC: 121 U/L (ref 40–110)
ALT SERPL W P-5'-P-CCNC: 124 U/L (ref 8–55)
ANION GAP SERPL CALC-SCNC: 10 MMOL/L (ref 10–20)
AST SERPL-CCNC: 185 U/L (ref 5–34)
BASOPHILS # BLD AUTO: 0.1 THOU/UL (ref 0–0.2)
BASOPHILS NFR BLD AUTO: 1.1 % (ref 0–1)
BILIRUB SERPL-MCNC: 0.5 MG/DL (ref 0.2–1.2)
BUN SERPL-MCNC: 6 MG/DL (ref 8.9–20.6)
CALCIUM SERPL-MCNC: 8.4 MG/DL (ref 7.8–10.44)
CHLORIDE SERPL-SCNC: 106 MMOL/L (ref 98–107)
CO2 SERPL-SCNC: 28 MMOL/L (ref 22–29)
CREAT CL PREDICTED SERPL C-G-VRATE: 117 ML/MIN (ref 70–130)
EOSINOPHIL # BLD AUTO: 0.2 THOU/UL (ref 0–0.7)
EOSINOPHIL NFR BLD AUTO: 3.6 % (ref 0–10)
GLOBULIN SER CALC-MCNC: 1.6 G/DL (ref 2.4–3.5)
GLUCOSE SERPL-MCNC: 95 MG/DL (ref 70–105)
HGB BLD-MCNC: 6.9 G/DL (ref 14–18)
HGB BLD-MCNC: 8.1 G/DL (ref 14–18)
INR PPP: 0.9
LYMPHOCYTES # BLD: 1.6 THOU/UL (ref 1.2–3.4)
LYMPHOCYTES NFR BLD AUTO: 28.5 % (ref 21–51)
MCH RBC QN AUTO: 34.4 PG (ref 27–31)
MCV RBC AUTO: 100 FL (ref 78–98)
MONOCYTES # BLD AUTO: 0.7 THOU/UL (ref 0.11–0.59)
MONOCYTES NFR BLD AUTO: 12.8 % (ref 0–10)
NEUTROPHILS # BLD AUTO: 3.1 THOU/UL (ref 1.4–6.5)
NEUTROPHILS NFR BLD AUTO: 53.9 % (ref 42–75)
PLATELET # BLD AUTO: 143 THOU/UL (ref 130–400)
POTASSIUM SERPL-SCNC: 3.6 MMOL/L (ref 3.5–5.1)
PROTHROMBIN TIME: 12.3 SEC (ref 12–14.7)
RBC # BLD AUTO: 2.01 MILL/UL (ref 4.7–6.1)
SODIUM SERPL-SCNC: 140 MMOL/L (ref 136–145)
WBC # BLD AUTO: 5.7 THOU/UL (ref 4.8–10.8)

## 2020-04-22 RX ADMIN — SODIUM FERRIC GLUCONATE COMPLEX IN SUCROSE SCH MLS: 12.5 INJECTION INTRAVENOUS at 11:44

## 2020-04-22 RX ADMIN — SODIUM FERRIC GLUCONATE COMPLEX IN SUCROSE SCH MLS: 12.5 INJECTION INTRAVENOUS at 22:36

## 2020-04-22 RX ADMIN — THERA TABS SCH TAB: TAB at 08:50

## 2020-04-22 NOTE — PDOC.FM
- Subjective


Subjective: 





Pt denies further episodes of black stools, bloody emesis, or N/V. 





Hgb dropped to 6.9 this AM. 





No acute overnight events. 


VSS.





- Objective


MAR Reviewed: Yes


Vital Signs & Weight: 


 Vital Signs (12 hours)











  Temp Pulse Resp BP BP BP Pulse Ox


 


 04/22/20 04:57  97.8 F  72  16    104/61  98


 


 04/22/20 04:00     104/61   


 


 04/22/20 01:52  97.9 F  74  18  104/57 L   104/57 L  98


 


 04/21/20 20:06  98.4 F  94  18   100/65   98








 Weight











Admit Weight                   60.192 kg


 


Weight                         60.192 kg














I&O: 


 











 04/21/20 04/22/20 04/23/20





 06:59 06:59 06:59


 


Intake Total 2960 3200 


 


Balance 2960 3200 











Result Diagrams: 


 04/22/20 11:56





 04/22/20 05:12





Phys Exam





- Physical Examination


Constitutional: NAD


HEENT: moist MMs, sclera anicteric


conjunctival pallor 


Neck: no nodes, supple


Respiratory: no wheezing, clear to auscultation bilateral


Cardiovascular: RRR, no significant murmur


Gastrointestinal: soft, non-tender


Musculoskeletal: no edema, pulses present


Neurological: non-focal, moves all 4 limbs


Psychiatric: normal affect, A&O x 3


Skin: no rash, normal turgor, cap refill <2 seconds





Dx/Plan


(1) Gastrointestinal hemorrhage with melena


Code(s): K92.1 - MELENA   Status: Acute   





(2) Acute blood loss anemia


Code(s): D62 - ACUTE POSTHEMORRHAGIC ANEMIA   Status: Acute   





(3) ROSINA (acute kidney injury)


Code(s): N17.9 - ACUTE KIDNEY FAILURE, UNSPECIFIED   Status: Resolved   





(4) Alcoholic hepatitis


Code(s): K70.10 - ALCOHOLIC HEPATITIS WITHOUT ASCITES   Status: Acute   





(5) Hematemesis


Code(s): K92.0 - HEMATEMESIS   Status: Resolved   





- Plan


Plan: 





36yo M with h/o EtOH abuse presents for hematemasis and nausea





#Suspected Acute Alcoholic hepatitis


- 3 day onset of nausea, vomiting, mild epigastric pain - improved this AM


- Transaminitis with -> 172-> 205-> 185, -> 120-> 109-> 124, 

Tbili 2.2-> 1.3-> 0.5


- Hepatitis panel neg, HIV and RPR neg


- CT abd - enlarged, fatty liver


- RUQ US, hepatomegaly 


- INR 1.2, 0.9


- UDS, APA, and EtOH lv's negative


- Pt overall improved this AM with no furhter episodes of melena or hematemesis

, eager to quit alcohol for good





#Upper GI bleed, 2/2 X2 Stefani Dee tears and erosive esophagitis 


- likely 2/2 above. approx 250cc hematemesis at bedside in ED


- no further hematemesis since admission, nausea resolved


- GI consulted, Dr. Klein, apprec recs


   - EGD on 4/19 showed X2 stefani dee tears and erosive esophagitis KELTON TUBBS


   - Continue PPI IV 40 mg BID 


   - Clear liquid diet, advance as tolerated 


   - antiemetics


   - transfuse below hgb 7 hct 21


- Received 2 units pRBC on 4/20. 


- Zofran PRN


- Hb 13.5 on admission, 5.9 hgb on 4/20, 8.0 on 4/20 post transfusion. 7.3 on 4/ 21. 6.9 on 4/22


- Ordered 1 unit pRBC and recheck H&H 4 hours post transfusion. 





#Acute blood loss anemia 


- Hb 13.5 on admission, 5.9 hgb on 4/20, 7.3 on 4/21. 6.9 on 4/22. 


- given 2 unit pRBC on 4/20 and recheck H&H 4 hours post transfusion was 8.0. 


- 1 unit ordered 4/22. 





#EtOH abuse with withdrawals


- Last drink 4/16, drinks 4+ glasses of wine nightly


- ASE protocol, scores 2, 2, 1, 1 overnight


- Librium taper


- Folic acid, MV, Thiamine


- wants to quit drinking, discussed AA meetings and librium taper





#Anxiety


- Self-treated with alcohol in past


- interested in starting SSRI


- Had SE of sexual dysfunction with zoloft


- will start lexapro after GI workup or at f/u visit with PCP 





#Suspected prerenal ROSINA, improved 


- Cr 1.55-> 0.75


- monitor


- replace lytes as necessary





#Leukocytosis, resolved 


- WBC 13.4, likely 2/2 volume depletion--> 5.2 -> 4.5


- no s/s of infection, will monitor





#Pulmonary nodule


- incidental findings on CT Abd


- needs outpt follow up





#Tob abuse


- counseled on cessation





#Lactose Intolerance


- avoid triggering foods





PCP: None - Dr. Saul many years ago


Code: Full





IVF: LR @ 100cc/hr


Diet: advance as tolerated. Tolerating diet well. 


VTE: none - upper GI bleed 





Disposition/LOS: 


Admitted to medical inpt for acute alcoholic hepatitis and hematemasis. GI 

consulted. Monitor for EtOH withdrawals. Treating underlying conditions. 

Anticipate LOS > 48hrs.





Addendum - Attending





- Attending Attestation


Date/Time: 04/22/20 0578





I personally evaluated the patient and discussed the management with Dr. Woods. 


I agree with the History, Examination, Assessment and Plan documented above 

with any addition or exceptions noted below.





Tx 1 uPRBC and give iron infusion today. Advancing diet. Possible D/c tomorrow 

but want to see 24 hrs of stable H&H.

## 2020-04-23 VITALS — SYSTOLIC BLOOD PRESSURE: 119 MMHG | DIASTOLIC BLOOD PRESSURE: 70 MMHG | TEMPERATURE: 98.1 F

## 2020-04-23 LAB
ALBUMIN SERPL BCG-MCNC: 3.2 G/DL (ref 3.5–5)
ALP SERPL-CCNC: 125 U/L (ref 40–110)
ALT SERPL W P-5'-P-CCNC: 114 U/L (ref 8–55)
ANION GAP SERPL CALC-SCNC: 8 MMOL/L (ref 10–20)
AST SERPL-CCNC: 115 U/L (ref 5–34)
BILIRUB SERPL-MCNC: 0.5 MG/DL (ref 0.2–1.2)
BUN SERPL-MCNC: 6 MG/DL (ref 8.9–20.6)
CALCIUM SERPL-MCNC: 8.5 MG/DL (ref 7.8–10.44)
CHLORIDE SERPL-SCNC: 106 MMOL/L (ref 98–107)
CO2 SERPL-SCNC: 30 MMOL/L (ref 22–29)
CREAT CL PREDICTED SERPL C-G-VRATE: 104 ML/MIN (ref 70–130)
GLOBULIN SER CALC-MCNC: 1.7 G/DL (ref 2.4–3.5)
GLUCOSE SERPL-MCNC: 95 MG/DL (ref 70–105)
HGB BLD-MCNC: 8.2 G/DL (ref 14–18)
INR PPP: 0.9
MCH RBC QN AUTO: 35.2 PG (ref 27–31)
MCV RBC AUTO: 99.7 FL (ref 78–98)
MDIFF COMPLETE?: YES
PLATELET # BLD AUTO: 204 THOU/UL (ref 130–400)
POTASSIUM SERPL-SCNC: 3.3 MMOL/L (ref 3.5–5.1)
PROTHROMBIN TIME: 11.7 SEC (ref 12–14.7)
RBC # BLD AUTO: 2.32 MILL/UL (ref 4.7–6.1)
SODIUM SERPL-SCNC: 141 MMOL/L (ref 136–145)
WBC # BLD AUTO: 9 THOU/UL (ref 4.8–10.8)

## 2020-04-23 RX ADMIN — THERA TABS SCH TAB: TAB at 08:38

## 2020-04-23 NOTE — PDOC.FM
- Subjective


Subjective: 





Pt denies any N/V/D, black or bloody stool


eating well, and has great appetite. 





Hgb stable overnight 8.1-> 8.2!





- Objective


MAR Reviewed: Yes


Vital Signs & Weight: 


 Vital Signs (12 hours)











  Temp Pulse Resp BP BP Pulse Ox


 


 04/23/20 07:42  98.1 F  87  16   119/70  97


 


 04/22/20 20:05     110/67  


 


 04/22/20 20:03       98








 Weight











Admit Weight                   60.192 kg


 


Weight                         60.192 kg














I&O: 


 











 04/22/20 04/23/20 04/24/20





 06:59 06:59 06:59


 


Intake Total 3200 3445 


 


Balance 3200 3445 











Result Diagrams: 


 04/23/20 05:05





 04/23/20 05:05





Phys Exam





- Physical Examination


Constitutional: NAD


HEENT: PERRLA, moist MMs, sclera anicteric


Neck: no nodes, no JVD, full ROM


Respiratory: no wheezing, no rales, no rhonchi, clear to auscultation bilateral


Cardiovascular: RRR, no significant murmur, no rub


Gastrointestinal: soft, non-tender, no distention, positive bowel sounds


Musculoskeletal: no edema, pulses present


Neurological: non-focal, normal sensation


Psychiatric: normal affect, A&O x 3


Skin: no rash, normal turgor, cap refill <2 seconds





Dx/Plan


(1) Gastrointestinal hemorrhage with melena


Code(s): K92.1 - MELENA   Status: Acute   





(2) Acute blood loss anemia


Code(s): D62 - ACUTE POSTHEMORRHAGIC ANEMIA   Status: Acute   





(3) Alcoholic hepatitis


Code(s): K70.10 - ALCOHOLIC HEPATITIS WITHOUT ASCITES   Status: Acute   





- Plan


Plan: 





34yo M with h/o EtOH abuse presents for hematemasis and nausea





#Suspected Acute Alcoholic hepatitis


- 3 day onset of nausea, vomiting, mild epigastric pain - improved this AM


- Transaminitis with -> 172-> 205-> 185, -> 120-> 109-> 124, 

Tbili 2.2-> 1.3-> 0.5


- Hepatitis panel neg, HIV and RPR neg


- CT abd - enlarged, fatty liver


- RUQ US, hepatomegaly 


- INR 1.2, 0.9


- UDS, APA, and EtOH lv's negative


- Pt overall improved this AM with no further episodes of melena or hematemesis

, eager to quit alcohol for good





#Upper GI bleed, 2/2 X2 Stefani Dee tears and erosive esophagitis 


- likely 2/2 above. approx 250cc hematemesis at bedside in ED


- no further hematemesis since admission, nausea resolved


- GI consulted, Dr. Klein, apprec recs


   - EGD on 4/19 showed X2 stefani dee tears and erosive esophagitis KELTON TUBBS


   - Continue PPI IV 40 mg BID 


   - Clear liquid diet, advance as tolerated 


   - antiemetics


   - transfuse below hgb 7 hct 21


- Received 2 units pRBC on 4/20, and 1 unit on 4/22. 


- Zofran PRN


- Hb 13.5 on admission, 5.9 hgb on 4/20, 8.0 on 4/20 post transfusion. 7.3 on 4/ 21. 6.9 on 4/22. 8.1 on 4/22 4 hours post transfusion. 8.2 on 4/23.  





#Acute blood loss anemia, stable. 


- Hb 13.5 on admission, 5.9 hgb on 4/20, 7.3 on 4/21. 6.9 on 4/22. 


- given 2 unit pRBC on 4/20 and recheck H&H 4 hours post transfusion was 8.0. 

Given 1 unit pRBC on 4/22, 8.1 4 hour hgb post transfusion. 8.2 on 4/23. 





#EtOH abuse with withdrawals


- Last drink 4/16, drinks 4+ glasses of wine nightly


- ASE protocol, scores 2, 2, 1, 1 overnight


- Librium taper


- Folic acid, MV, Thiamine


- wants to quit drinking, discussed AA meetings and librium taper





#Anxiety


- Self-treated with alcohol in past


- interested in starting SSRI


- Had SE of sexual dysfunction with zoloft


- will start lexapro after GI workup or at f/u visit with PCP 





#Suspected prerenal ROSINA, improved 


- Cr 1.55-> 0.75 -> 0.85


- monitor


- replace lytes as necessary





#Leukocytosis, resolved 


- WBC 13.4, likely 2/2 volume depletion--> 5.2 -> 4.5 -> 9.0


- no s/s of infection, will monitor





#Pulmonary nodule


- incidental findings on CT Abd


- needs outpt follow up





#Tob abuse


- counseled on cessation





#Lactose Intolerance


- avoid triggering foods





PCP: None - Dr. Saul many years ago


Code: Full





IVF: LR @ 100cc/hr


Diet: advance as tolerated. Tolerating diet well. 


VTE: none - upper GI bleed 





Disposition/LOS: 


Admitted to medical inpt for acute alcoholic hepatitis and hematemasis. GI 

consulted. Monitor for EtOH withdrawals. Treating underlying conditions. 

Anticipate LOS > 48hrs.


Stable  hgb, and probable soon D/C home. 





Addendum - Attending





- Attending Attestation


Date/Time: 04/23/20 6305





I personally evaluated the patient and discussed the management with Dr. Resendez. 


I agree with the History, Examination, Assessment and Plan documented above 

with any addition or exceptions noted below.





d/c home on librium taper. f/u 2 wk at [facility].

## 2020-04-23 NOTE — PQF
ANANT WEEMS LESLIE *
r

[id number]                                                             [id number]
[id number]

[id number]                             

                                   

CLINICAL DOCUMENTATION IMPROVEMENT CLARIFICATION FORM:  ICD-10 Updated



PLEASE DO AN ADDENDUM TO THE PROGRESS NOTE WITH ANY DOCUMENTATION UPDATES OR 
ADDITIONS AND CARRY THROUGH TO DC SUMMARY.   THANK YOU.



Date: 4/23/2020                                  ATTN:  DR. PERFECTO CROW



Please exercise your independent, professional judgment in responding to the 
clarification form. Clinical indicators are provided on the bottom of this form 
for your review.



Please check appropriate box(s):



 Underweight without malnutrition





In addition, please specify:

Present on Admission (POA):  [ * ] Yes             

CLINICAL INDICATORS - SIGNS / SYMPTOMS / LABS    / RESULTS AND LOCATION IN MR





4/20 RD ASSESSMENT: CALCULATED BMI 17.9; 5-11 % WT LOSS IN UNKNOWN TIME FRAME.; 
PT REPORTS HE THINKS HE HAS LOST WEIGHT D/T POOR APPETITE. DIET RECALL LIKELY 
MEETING LESS THAN 75% ESTIMATED NUTRIENT NEEDS FOR GREATER THAN 3 MONTHS WITH 
REPLACEMENT OF ALCOHOL 3-4 GLASSES WINE/DAY WITH NUTRITION SUGGESTING SEVERE 
MALNUTRITION .



4/18  H&P ( [facility]GELICA) PT PRESENTS FOR 3 DAY HISTORY OF NAUSEA AND VOMITING, A/P : 
ALCOHOL ABUSE WITH WITHDRAWALS.  HE STATES THAT ON 4/16 HE STARTED T0 HAVE 
GENERAL MALAISE AND LOSS OF APPETITE. 



RISK: 

HX LACTOSE INTOLERANCE, ALCOHOLIC HEPATITIS (CONSULT/CHERRIE) 4/19

DX GI BLEED ( PN/TRISTIN) 4/20



TREATMENTS:

DIETARY CONSULT 2/20

GI CONSULT 4/10

RECOMMEND ENSURE ENLIVE ONCE DAILY.





Moderate Malnutrition (in acute illness)

Energy Intake: <75% of estimated energy requirement for > 7 days

Weight Loss:  1-2%/1 week;  5%/ 1 month; 7.5%/3 months

Other: mild body fat loss; mild muscle mass loss; mild fluid accumulation; 



Severe Malnutrition (in acute illness)

Energy Intake: < 50% of estimated energy requirement for > 5 days

Weight Loss: >1-2%/1 week; >5%/1 month; >7.5%/3 months

Other: moderate body fat loss; moderate muscle mass loss; moderate- severe 
fluid accumulation; measurably reduced  strength



Moderate Malnutrition (in chronic illness)

Energy Intake: <75% of estimated energy requirement for >1 month

Weight Loss: 5%/1 month; 7.5%/3 months; 10%/6 months; 20%/1 year

Other: mild body fat loss; mild muscle mass loss; mild fluid accumulation



Severe Malnutrition (in chronic illness)

Energy Intake: <75% of estimated energy requirement for >1 month

Weight Loss: >5%/1 month; >7.5%/3 months; >10%/6 months; >20%/1 year

Other: severe body fat loss; severe muscle mass loss; severe fluid accumulation
; measurably reduced  strength



THANK YOU! TAMMY





(This form is maintained as a part of the permanent medical record)

 2015 [employer]  All Rights Reserved

[email or web address]    Cell Phone: [phone]

                                                              

[facility]

## 2020-04-24 NOTE — DIS
DATE OF ADMISSION:  04/18/2020



DATE OF DISCHARGE:  04/23/2020



ADMITTING ATTENDING:  Lakhwinder Cannon MD.



DISCHARGE ATTENDING:  Aly Linn MD.



CONSULTS:  Gastroenterology, Dr. Klein.  Walking program.



PROCEDURES:  EGD on 04/19, which showed 2 Janis-Wallace tears with exposed vessels

as well as distal esophagitis. 



DIAGNOSES:  

1. Suspected acute alcoholic hepatitis.

2. Upper gastrointestinal bleed secondary to Janis-Wallace tears and erosive

esophagitis. 

3. Acute blood loss anemia.

4. Alcohol abuse with withdrawals.

5. Anxiety.

6. Suspected pre-renal acute kidney injury, improved.

7. Leukocytosis, resolved.

8. Pulmonary nodule of uncertain significance.

9. Tobacco abuse.

10. Lactose intolerance.



DISCHARGE MEDICATIONS:  Lexapro 10 mg p.o. daily; ferrous sulfate 325 mg p.o. daily;

folic acid 1 mg p.o. daily; multivitamin one tab p.o. daily; Protonix 40 mg p.o.

daily; thiamine 300 mg p.o. daily; Librium taper 25 mg 24 tabs given, take one pill

b.i.d. for 4 days and take 1 pill once a day for 4 days. 



HISTORY OF PRESENT ILLNESS/HOSPITAL COURSE:  Mr. Melonie Reddy is a 35-year-old male

with past medical history of alcohol abuse, coming into the emergency department due

to nausea and vomiting blood.  He had been drinking heavily recently and started to

retch and started vomiting blood.  He was found initially to have a hemoglobin of

13.5, which downtrended to 7.9 and 7.6.  He was scoped by Dr. Klein with EGD on

04/19, which found 2 Janis-Wallace tears and erosive esophagitis.  The patient had

been on b.i.d. Protonix IV while in the hospital.  The bleeding stopped, but he

continue to downtrend in his hemoglobin possibly showing his true numbers from the

initial GI bleed, went down to 5.9.  He was given 2 units of blood on 04/20, it came

up to 8.0, the next day 7.3 and then 6.9 on 04/22, where he was given 1 unit of

blood, which came to 8.1.  The next day, his hemoglobin was 8.2 on 04/23, showing

signs of stability in his blood loss.  He was also given iron infusion on 04/23.

The patient was incidentally found to have a pulmonary nodule which will need

followup in the outpatient setting.  The patient is aware of this and understands

that this needs to be worked up further, especially since he is a smoker.  The

patient was sent home on Lexapro 10 mg once a day as he was using alcohol to free

his anxiety and would like to start a medication in the past, Zoloft that caused

erectile dysfunction.  He would like to not experience that.  We also sent him oral

iron to help build up in the blood and a Librium taper.  He was advised to not take

the Librium taper and alcohol at the same time and understands the risks in doing

so. 



DISPOSITION:  Stable upon discharge with improved/stable hemoglobin.



DISCHARGE INSTRUCTIONS:  LOCATION:  To home. 



DIET:  Regular diet with soft foods. 



ACTIVITY:  As tolerated. 



FOLLOWUP:  Follow up with primary care physician.  Patient would like to establish

with [facility], Dr. Dozier, and follow up with Dr. Klein in 2 to 3

weeks. 







Job ID:  [id number]